# Patient Record
Sex: MALE | Race: BLACK OR AFRICAN AMERICAN | Employment: FULL TIME | URBAN - METROPOLITAN AREA
[De-identification: names, ages, dates, MRNs, and addresses within clinical notes are randomized per-mention and may not be internally consistent; named-entity substitution may affect disease eponyms.]

---

## 2022-04-25 ENCOUNTER — HOSPITAL ENCOUNTER (OUTPATIENT)
Dept: GENERAL RADIOLOGY | Age: 57
Discharge: HOME | End: 2022-04-27
Payer: COMMERCIAL

## 2022-04-25 ENCOUNTER — HOSPITAL ENCOUNTER (OUTPATIENT)
Age: 57
Discharge: HOME | End: 2022-04-25
Payer: COMMERCIAL

## 2022-04-25 ENCOUNTER — HOSPITAL ENCOUNTER (OUTPATIENT)
Age: 57
Discharge: HOME | End: 2022-04-27
Payer: COMMERCIAL

## 2022-04-25 DIAGNOSIS — R52 PAIN: ICD-10-CM

## 2022-04-25 LAB
ALBUMIN SERPL-MCNC: 3.9 G/DL (ref 3.5–5.2)
ALP BLD-CCNC: 80 U/L (ref 40–129)
ALT SERPL-CCNC: 51 U/L (ref 0–40)
ANION GAP SERPL CALCULATED.3IONS-SCNC: 7 MMOL/L (ref 7–16)
AST SERPL-CCNC: 37 U/L (ref 0–39)
BASOPHILS ABSOLUTE: 0.05 E9/L (ref 0–0.2)
BASOPHILS RELATIVE PERCENT: 0.6 % (ref 0–2)
BILIRUB SERPL-MCNC: <0.2 MG/DL (ref 0–1.2)
BUN BLDV-MCNC: 21 MG/DL (ref 6–20)
CALCIUM SERPL-MCNC: 9 MG/DL (ref 8.6–10.2)
CHLORIDE BLD-SCNC: 105 MMOL/L (ref 98–107)
CHOLESTEROL, TOTAL: 171 MG/DL (ref 0–199)
CO2: 29 MMOL/L (ref 22–29)
CREAT SERPL-MCNC: 0.9 MG/DL (ref 0.7–1.2)
EOSINOPHILS ABSOLUTE: 0.33 E9/L (ref 0.05–0.5)
EOSINOPHILS RELATIVE PERCENT: 4.2 % (ref 0–6)
GFR AFRICAN AMERICAN: >60
GFR NON-AFRICAN AMERICAN: >60 ML/MIN/1.73
GLUCOSE BLD-MCNC: 116 MG/DL (ref 74–99)
HCT VFR BLD CALC: 38 % (ref 37–54)
HDLC SERPL-MCNC: 61 MG/DL
HEMOGLOBIN: 12.2 G/DL (ref 12.5–16.5)
IMMATURE GRANULOCYTES #: 0.03 E9/L
IMMATURE GRANULOCYTES %: 0.4 % (ref 0–5)
LDL CHOLESTEROL CALCULATED: 95 MG/DL (ref 0–99)
LYMPHOCYTES ABSOLUTE: 2.42 E9/L (ref 1.5–4)
LYMPHOCYTES RELATIVE PERCENT: 30.6 % (ref 20–42)
MCH RBC QN AUTO: 28 PG (ref 26–35)
MCHC RBC AUTO-ENTMCNC: 32.1 % (ref 32–34.5)
MCV RBC AUTO: 87.2 FL (ref 80–99.9)
MONOCYTES ABSOLUTE: 0.65 E9/L (ref 0.1–0.95)
MONOCYTES RELATIVE PERCENT: 8.2 % (ref 2–12)
NEUTROPHILS ABSOLUTE: 4.43 E9/L (ref 1.8–7.3)
NEUTROPHILS RELATIVE PERCENT: 56 % (ref 43–80)
PDW BLD-RTO: 14.7 FL (ref 11.5–15)
PLATELET # BLD: 305 E9/L (ref 130–450)
PMV BLD AUTO: 9.9 FL (ref 7–12)
POTASSIUM SERPL-SCNC: 3.7 MMOL/L (ref 3.5–5)
PROSTATE SPECIFIC ANTIGEN: 1.26 NG/ML (ref 0–4)
RBC # BLD: 4.36 E12/L (ref 3.8–5.8)
RHEUMATOID FACTOR: <10 IU/ML (ref 0–13)
SEDIMENTATION RATE, ERYTHROCYTE: 16 MM/HR (ref 0–15)
SODIUM BLD-SCNC: 141 MMOL/L (ref 132–146)
TOTAL PROTEIN: 7.2 G/DL (ref 6.4–8.3)
TRIGL SERPL-MCNC: 74 MG/DL (ref 0–149)
TSH SERPL DL<=0.05 MIU/L-ACNC: 0.96 UIU/ML (ref 0.27–4.2)
VITAMIN D 25-HYDROXY: 39 NG/ML (ref 30–100)
VLDLC SERPL CALC-MCNC: 15 MG/DL
WBC # BLD: 7.9 E9/L (ref 4.5–11.5)

## 2022-04-25 PROCEDURE — 86431 RHEUMATOID FACTOR QUANT: CPT

## 2022-04-25 PROCEDURE — G0103 PSA SCREENING: HCPCS

## 2022-04-25 PROCEDURE — 84443 ASSAY THYROID STIM HORMONE: CPT

## 2022-04-25 PROCEDURE — 85025 COMPLETE CBC W/AUTO DIFF WBC: CPT

## 2022-04-25 PROCEDURE — 82306 VITAMIN D 25 HYDROXY: CPT

## 2022-04-25 PROCEDURE — 36415 COLL VENOUS BLD VENIPUNCTURE: CPT

## 2022-04-25 PROCEDURE — 85651 RBC SED RATE NONAUTOMATED: CPT

## 2022-04-25 PROCEDURE — 80061 LIPID PANEL: CPT

## 2022-04-25 PROCEDURE — 73610 X-RAY EXAM OF ANKLE: CPT

## 2022-04-25 PROCEDURE — 80053 COMPREHEN METABOLIC PANEL: CPT

## 2022-04-25 PROCEDURE — 73562 X-RAY EXAM OF KNEE 3: CPT

## 2022-09-26 ENCOUNTER — OFFICE VISIT (OUTPATIENT)
Dept: PODIATRY | Age: 57
End: 2022-09-26
Payer: COMMERCIAL

## 2022-09-26 VITALS — BODY MASS INDEX: 41.31 KG/M2 | HEIGHT: 72 IN | WEIGHT: 305 LBS

## 2022-09-26 DIAGNOSIS — M25.571 SINUS TARSITIS, RIGHT: ICD-10-CM

## 2022-09-26 DIAGNOSIS — Q66.6 PES PLANOVALGUS: ICD-10-CM

## 2022-09-26 DIAGNOSIS — M25.571 RIGHT ANKLE PAIN, UNSPECIFIED CHRONICITY: Primary | ICD-10-CM

## 2022-09-26 DIAGNOSIS — M76.821 POSTERIOR TIBIAL TENDINITIS, RIGHT: ICD-10-CM

## 2022-09-26 PROCEDURE — 1036F TOBACCO NON-USER: CPT | Performed by: PODIATRIST

## 2022-09-26 PROCEDURE — G8427 DOCREV CUR MEDS BY ELIG CLIN: HCPCS | Performed by: PODIATRIST

## 2022-09-26 PROCEDURE — 3017F COLORECTAL CA SCREEN DOC REV: CPT | Performed by: PODIATRIST

## 2022-09-26 PROCEDURE — 99203 OFFICE O/P NEW LOW 30 MIN: CPT | Performed by: PODIATRIST

## 2022-09-26 PROCEDURE — G8417 CALC BMI ABV UP PARAM F/U: HCPCS | Performed by: PODIATRIST

## 2022-09-26 RX ORDER — HYDROCHLOROTHIAZIDE 12.5 MG/1
TABLET ORAL
COMMUNITY
Start: 2022-08-31

## 2022-09-26 RX ORDER — AMLODIPINE BESYLATE 10 MG/1
TABLET ORAL
COMMUNITY
Start: 2022-08-24

## 2022-09-26 RX ORDER — DICLOFENAC SODIUM 75 MG/1
TABLET, DELAYED RELEASE ORAL
COMMUNITY
Start: 2022-09-08

## 2022-09-26 NOTE — PROGRESS NOTES
New patient in office with c/o right ankle pain[place] [facility] [place] a left knee replacement and would like ankle evaluated prior. Denies any injury. Sx x several years.

## 2022-09-26 NOTE — PROGRESS NOTES
[place] Numbers : [id number] Sex: male  Age: 65 y.o. Patient was referred by Angeles Burrell MD    CC:    Right ankle pain    HPI:   This pleasant 46-year-old male patient referred to me today for right ankle pain. Does have a history of left knee pain with likely total knee replacement on the left knee. No open skin lesions or abrasions. Denies history of diabetes. Does state tenderness on the inside and outside the right foot and ankle worse when he is walking. No current custom or over-the-counter inserts. Does work mostly on his feet. Denies any current use of compression stockings. No additional pedal complaints at this time. ROS:  Const: Denies constitutional symptoms  Musculo: Denies symptoms other than stated above  Skin: Denies symptoms other than stated above       Current Outpatient Medications:     amLODIPine (NORVASC) 10 MG tablet, , Disp: , Rfl:     diclofenac (VOLTAREN) 75 MG EC tablet, TAKE 1 TABLET BY MOUTH TWICE DAILY, Disp: , Rfl:     hydroCHLOROthiazide (HYDRODIURIL) 12.5 MG tablet, , Disp: , Rfl:   No Known Allergies    No past medical history on file. Vitals:    22 0943   Weight: (!) 305 lb (138.3 kg)   Height: 6' (1.829 m)       Work History/Social History: Foot and ankle history:     Focused Lower Extremity Physical Exam:    Neurovascular examination:    Dorsalis Pedis palpable bilateral.  Posterior tibialis palpable bilateral.    Capillary Refill Time:  Immediate return  Hair growth:  Symmetrical and bilateral   Skin:  Not atrophic  Edema: No edema bilateral feet or ankles. Neurologic:  Light touch intact bilateral.      Musculoskeletal/ Orthopedic examination:    Equinis: Absent bilateral  Dorsiflexion, plantarflexion, inversion, eversion bilateral 5 out of 5 muscle strength  Wiggling toes  Negative Homans  Pes planovalgus bilateral worse on the right  Mild tenderness insertion posterior tibial tendon right.   Mild tenderness within sinus tarsi with inversion and eversion right foot and ankle. Dermatology examination:    No open skin lesions or abrasions bilateral lower extremity. Assessment and Plan:  Laurent Russell was seen today for ankle pain. Diagnoses and all orders for this visit:    Right ankle pain, unspecified chronicity    Posterior tibial tendinitis, right    Sinus tarsitis, right          MRI right ankle    IMPRESSION:  1. Moderate chronic plantar fasciitis. 2. 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 3. Mild subtalar degenerative change. History adry davila      Laurent Russell is seen today new referral.  Clinically presents posterior tibial tendinitis, sinus tarsitis and pes planovalgus. We did discuss MRI right ankle from [facility]. I did recommend power step orthotics. We did also recommend full-length custom inserts to accommodate posterior tibial tendinitis and sinus tarsitis. Likely needs surgery upcoming left knee. Hopefully we do have the custom inserts soon as well as over-the-counter inserts for his regular shoes. I did discuss cortisone injection sinus tarsi if continued symptoms. I would recommend continued conservative treatment from foot and ankle standpoint at this time and will follow-up in office 6 weeks.

## 2022-09-30 ENCOUNTER — TELEPHONE (OUTPATIENT)
Dept: PODIATRY | Age: 57
End: 2022-09-30

## 2022-09-30 NOTE — TELEPHONE ENCOUNTER
[address] to verify benefits for Orthotics ()  Spoke with Sanya Galeana. Orthotics () covered @ 0 %. Patent does not have any coverage through his plan for any type of insert, orthotic, or arch support. Ref # [id number]    Patient informed. Has a script for orthotics.

## 2023-07-27 ENCOUNTER — HOSPITAL ENCOUNTER (OUTPATIENT)
Age: 58
Discharge: HOME | End: 2023-07-27
Payer: COMMERCIAL

## 2023-07-27 LAB
ALBUMIN SERPL-MCNC: 4.2 G/DL (ref 3.5–5.2)
ALP SERPL-CCNC: 73 U/L (ref 40–129)
ALT SERPL-CCNC: 66 U/L (ref 0–40)
ANION GAP SERPL CALCULATED.3IONS-SCNC: 11 MMOL/L (ref 7–16)
AST SERPL-CCNC: 55 U/L (ref 0–39)
BASOPHILS # BLD: 0.04 K/UL (ref 0–0.2)
BASOPHILS NFR BLD: 1 % (ref 0–2)
BILIRUB SERPL-MCNC: 0.6 MG/DL (ref 0–1.2)
BUN SERPL-MCNC: 22 MG/DL (ref 6–20)
CALCIUM SERPL-MCNC: 8.6 MG/DL (ref 8.6–10.2)
CHLORIDE SERPL-SCNC: 109 MMOL/L (ref 98–107)
CHOLEST SERPL-MCNC: 171 MG/DL
CO2 SERPL-SCNC: 27 MMOL/L (ref 22–29)
CREAT SERPL-MCNC: 1 MG/DL (ref 0.7–1.2)
EOSINOPHIL # BLD: 0.32 K/UL (ref 0.05–0.5)
EOSINOPHILS RELATIVE PERCENT: 5 % (ref 0–6)
ERYTHROCYTE [DISTWIDTH] IN BLOOD BY AUTOMATED COUNT: 14.9 % (ref 11.5–15)
ERYTHROCYTE [SEDIMENTATION RATE] IN BLOOD BY WESTERGREN METHOD: 8 MM/HR (ref 0–15)
GFR SERPL CREATININE-BSD FRML MDRD: >60 ML/MIN/1.73M2
GLUCOSE SERPL-MCNC: 96 MG/DL (ref 74–99)
HCT VFR BLD AUTO: 37.7 % (ref 37–54)
HDLC SERPL-MCNC: 67 MG/DL
HGB BLD-MCNC: 11.9 G/DL (ref 12.5–16.5)
IMM GRANULOCYTES # BLD AUTO: <0.03 K/UL (ref 0–0.58)
IMM GRANULOCYTES NFR BLD: 0 % (ref 0–5)
LDLC SERPL CALC-MCNC: 90 MG/DL
LYMPHOCYTES NFR BLD: 2.36 K/UL (ref 1.5–4)
LYMPHOCYTES RELATIVE PERCENT: 35 % (ref 20–42)
MCH RBC QN AUTO: 27.9 PG (ref 26–35)
MCHC RBC AUTO-ENTMCNC: 31.6 G/DL (ref 32–34.5)
MCV RBC AUTO: 88.3 FL (ref 80–99.9)
MONOCYTES NFR BLD: 0.67 K/UL (ref 0.1–0.95)
MONOCYTES NFR BLD: 10 % (ref 2–12)
NEUTROPHILS NFR BLD: 50 % (ref 43–80)
NEUTS SEG NFR BLD: 3.44 K/UL (ref 1.8–7.3)
PLATELET # BLD AUTO: 279 K/UL (ref 130–450)
PMV BLD AUTO: 10.4 FL (ref 7–12)
POTASSIUM SERPL-SCNC: 3.8 MMOL/L (ref 3.5–5)
PROT SERPL-MCNC: 7 G/DL (ref 6.4–8.3)
PSA SERPL-MCNC: 0.91 NG/ML (ref 0–4)
RBC # BLD AUTO: 4.27 M/UL (ref 3.8–5.8)
SODIUM SERPL-SCNC: 147 MMOL/L (ref 132–146)
TRIGL SERPL-MCNC: 70 MG/DL
VLDLC SERPL CALC-MCNC: 14 MG/DL
WBC OTHER # BLD: 6.8 K/UL (ref 4.5–11.5)

## 2023-07-27 PROCEDURE — 82652 VIT D 1 25-DIHYDROXY: CPT

## 2023-07-27 PROCEDURE — G0103 PSA SCREENING: HCPCS

## 2023-07-27 PROCEDURE — 80061 LIPID PANEL: CPT

## 2023-07-27 PROCEDURE — 80053 COMPREHEN METABOLIC PANEL: CPT

## 2023-07-27 PROCEDURE — 85652 RBC SED RATE AUTOMATED: CPT

## 2023-07-27 PROCEDURE — 36415 COLL VENOUS BLD VENIPUNCTURE: CPT

## 2023-07-27 PROCEDURE — 85027 COMPLETE CBC AUTOMATED: CPT

## 2023-08-02 LAB — 1,25(OH)2D3 SERPL-MCNC: 47.9 PG/ML (ref 19.9–79.3)

## 2023-10-04 ENCOUNTER — HOSPITAL ENCOUNTER (OUTPATIENT)
Age: 58
Discharge: HOME | End: 2023-10-04
Payer: COMMERCIAL

## 2023-10-04 LAB
ANION GAP SERPL CALCULATED.3IONS-SCNC: 11 MMOL/L (ref 7–16)
BASOPHILS # BLD: 0.04 K/UL (ref 0–0.2)
BASOPHILS NFR BLD: 1 % (ref 0–2)
BUN SERPL-MCNC: 19 MG/DL (ref 6–20)
CALCIUM SERPL-MCNC: 8.9 MG/DL (ref 8.6–10.2)
CHLORIDE SERPL-SCNC: 109 MMOL/L (ref 98–107)
CO2 SERPL-SCNC: 26 MMOL/L (ref 22–29)
CREAT SERPL-MCNC: 1 MG/DL (ref 0.7–1.2)
EOSINOPHIL # BLD: 0.26 K/UL (ref 0.05–0.5)
EOSINOPHILS RELATIVE PERCENT: 5 % (ref 0–6)
ERYTHROCYTE [DISTWIDTH] IN BLOOD BY AUTOMATED COUNT: 14.5 % (ref 11.5–15)
GFR SERPL CREATININE-BSD FRML MDRD: >60 ML/MIN/1.73M2
GLUCOSE SERPL-MCNC: 89 MG/DL (ref 74–99)
HBA1C MFR BLD: 6.1 % (ref 4–5.6)
HCT VFR BLD AUTO: 37.3 % (ref 37–54)
HGB BLD-MCNC: 12.1 G/DL (ref 12.5–16.5)
IMM GRANULOCYTES # BLD AUTO: <0.03 K/UL (ref 0–0.58)
IMM GRANULOCYTES NFR BLD: 0 % (ref 0–5)
INR PPP: 1.1
LYMPHOCYTES NFR BLD: 2.02 K/UL (ref 1.5–4)
LYMPHOCYTES RELATIVE PERCENT: 36 % (ref 20–42)
MCH RBC QN AUTO: 28.5 PG (ref 26–35)
MCHC RBC AUTO-ENTMCNC: 32.4 G/DL (ref 32–34.5)
MCV RBC AUTO: 87.8 FL (ref 80–99.9)
MONOCYTES NFR BLD: 0.65 K/UL (ref 0.1–0.95)
MONOCYTES NFR BLD: 12 % (ref 2–12)
NEUTROPHILS NFR BLD: 47 % (ref 43–80)
NEUTS SEG NFR BLD: 2.69 K/UL (ref 1.8–7.3)
PLATELET # BLD AUTO: 273 K/UL (ref 130–450)
PMV BLD AUTO: 10.1 FL (ref 7–12)
POTASSIUM SERPL-SCNC: 3.8 MMOL/L (ref 3.5–5)
PROTHROMBIN TIME: 12.2 SEC (ref 9.3–12.4)
RBC # BLD AUTO: 4.25 M/UL (ref 3.8–5.8)
SODIUM SERPL-SCNC: 146 MMOL/L (ref 132–146)
WBC OTHER # BLD: 5.7 K/UL (ref 4.5–11.5)

## 2023-10-04 PROCEDURE — 85025 COMPLETE CBC W/AUTO DIFF WBC: CPT

## 2023-10-04 PROCEDURE — 80048 BASIC METABOLIC PNL TOTAL CA: CPT

## 2023-10-04 PROCEDURE — 85610 PROTHROMBIN TIME: CPT

## 2023-10-04 PROCEDURE — 36415 COLL VENOUS BLD VENIPUNCTURE: CPT

## 2023-10-04 PROCEDURE — 83036 HEMOGLOBIN GLYCOSYLATED A1C: CPT

## 2023-10-30 ENCOUNTER — HOSPITAL ENCOUNTER (OUTPATIENT)
Age: 58
Discharge: HOME | End: 2023-10-30
Payer: COMMERCIAL

## 2023-10-30 LAB
EKG ATRIAL RATE: 79 BPM
EKG P AXIS: 25 DEGREES
EKG P-R INTERVAL: 136 MS
EKG Q-T INTERVAL: 390 MS
EKG QRS DURATION: 86 MS
EKG QTC CALCULATION (BAZETT): 447 MS
EKG R AXIS: 46 DEGREES
EKG T AXIS: 178 DEGREES
EKG VENTRICULAR RATE: 79 BPM

## 2023-10-30 PROCEDURE — 93005 ELECTROCARDIOGRAM TRACING: CPT | Performed by: FAMILY MEDICINE

## 2023-10-30 PROCEDURE — 93010 ELECTROCARDIOGRAM REPORT: CPT | Performed by: INTERNAL MEDICINE

## 2023-11-07 ENCOUNTER — TELEPHONE (OUTPATIENT)
Dept: ADMINISTRATIVE | Age: 58
End: 2023-11-07

## 2023-11-07 NOTE — TELEPHONE ENCOUNTER
Patient Appointment Form:      PCP: Dr. Iris Daniels  Referring: Dr. Iris Daniels    Has the Patient:    Seen a Cardiologist? no    Had a heart catheterization? no    Had heart surgery? no    Had a stress test or nuclear stress test? no    Had an echocardiogram? no    Had a vascular ultrasound? no    Had a 24/48 heart monitor or extended cardiac event monitor? no    Had recent blood work in the last 6 months? yes    date: 10/04/2023    ordering physician: Michelle Braga    Had a pacemaker/ICD/ILR implant? no    Seen an Electrophysiologist? no        Will send records via: fax  Dr. Iris Daniels is faxing abnormal EKG to Office    Date & time of appointment:  [email or web address]

## 2023-11-08 ENCOUNTER — OFFICE VISIT (OUTPATIENT)
Dept: CARDIOLOGY CLINIC | Age: 58
End: 2023-11-08

## 2023-11-08 VITALS
RESPIRATION RATE: 18 BRPM | HEART RATE: 75 BPM | BODY MASS INDEX: 39.6 KG/M2 | WEIGHT: 298.8 LBS | SYSTOLIC BLOOD PRESSURE: 138 MMHG | DIASTOLIC BLOOD PRESSURE: 74 MMHG | HEIGHT: 73 IN

## 2023-11-08 DIAGNOSIS — I10 PRIMARY HYPERTENSION: ICD-10-CM

## 2023-11-08 DIAGNOSIS — E66.09 CLASS 2 OBESITY DUE TO EXCESS CALORIES WITHOUT SERIOUS COMORBIDITY WITH BODY MASS INDEX (BMI) OF 39.0 TO 39.9 IN ADULT: ICD-10-CM

## 2023-11-08 DIAGNOSIS — Z01.818 PRE-OP EVALUATION: ICD-10-CM

## 2023-11-08 DIAGNOSIS — R94.31 ABNORMAL EKG: Primary | ICD-10-CM

## 2023-11-08 RX ORDER — REGADENOSON 0.08 MG/ML
0.4 INJECTION, SOLUTION INTRAVENOUS
OUTPATIENT
Start: 2023-11-08

## 2023-11-08 RX ORDER — M-VIT,TX,IRON,MINS/CALC/FOLIC 27MG-0.4MG
1 TABLET ORAL DAILY
COMMUNITY

## 2023-11-08 RX ORDER — ASPIRIN 81 MG/1
81 TABLET ORAL DAILY
COMMUNITY

## 2023-11-09 ENCOUNTER — HOSPITAL ENCOUNTER (OUTPATIENT)
Age: 58
Discharge: HOME | End: 2023-11-11

## 2023-11-09 LAB
ABO + RH BLD: NORMAL
ARM BAND NUMBER: NORMAL
BLOOD BANK SAMPLE EXPIRATION: NORMAL
BLOOD GROUP ANTIBODIES SERPL: NEGATIVE

## 2023-11-09 PROCEDURE — 86900 BLOOD TYPING SEROLOGIC ABO: CPT

## 2023-11-09 PROCEDURE — 86901 BLOOD TYPING SEROLOGIC RH(D): CPT

## 2023-11-09 PROCEDURE — 87081 CULTURE SCREEN ONLY: CPT

## 2023-11-09 PROCEDURE — 86850 RBC ANTIBODY SCREEN: CPT

## 2023-11-10 ENCOUNTER — TELEPHONE (OUTPATIENT)
Dept: CARDIOLOGY | Age: 58
End: 2023-11-10

## 2023-11-10 NOTE — TELEPHONE ENCOUNTER
Left message on voice mail to remind patient of pharmacological stress test appointment on November 14, 2023 at 0900. Instructions for test, and COVID-19 preprocedure information left on voice mail. Asked patient to call with any questions or if unable to keep appointment.

## 2023-11-11 LAB
MICROORGANISM SPEC CULT: ABNORMAL
SPECIMEN DESCRIPTION: ABNORMAL

## 2023-11-14 ENCOUNTER — HOSPITAL ENCOUNTER (OUTPATIENT)
Dept: CARDIOLOGY | Age: 58
Discharge: HOME | End: 2023-11-16
Attending: INTERNAL MEDICINE
Payer: COMMERCIAL

## 2023-11-14 VITALS
RESPIRATION RATE: 16 BRPM | SYSTOLIC BLOOD PRESSURE: 140 MMHG | DIASTOLIC BLOOD PRESSURE: 88 MMHG | BODY MASS INDEX: 39.49 KG/M2 | HEIGHT: 73 IN | HEART RATE: 76 BPM | WEIGHT: 298 LBS

## 2023-11-14 DIAGNOSIS — R94.31 ABNORMAL EKG: ICD-10-CM

## 2023-11-14 LAB
ECHO BSA: 2.64 M2
STRESS BASELINE DIAS BP: 88 MMHG
STRESS BASELINE HR: 87 BPM
STRESS BASELINE SYS BP: 140 MMHG
STRESS ESTIMATED WORKLOAD: 1.1 METS
STRESS PEAK DIAS BP: 92 MMHG
STRESS PEAK SYS BP: 162 MMHG
STRESS PERCENT HR ACHIEVED: 75 %
STRESS POST PEAK HR: 122 BPM
STRESS RATE PRESSURE PRODUCT: NORMAL BPM*MMHG
STRESS TARGET HR: 163 BPM

## 2023-11-14 PROCEDURE — A9500 TC99M SESTAMIBI: HCPCS | Performed by: INTERNAL MEDICINE

## 2023-11-14 PROCEDURE — 3430000000 HC RX DIAGNOSTIC RADIOPHARMACEUTICAL: Performed by: INTERNAL MEDICINE

## 2023-11-14 PROCEDURE — 6360000002 HC RX W HCPCS: Performed by: INTERNAL MEDICINE

## 2023-11-14 PROCEDURE — 2580000003 HC RX 258: Performed by: INTERNAL MEDICINE

## 2023-11-14 PROCEDURE — 93017 CV STRESS TEST TRACING ONLY: CPT

## 2023-11-14 RX ORDER — REGADENOSON 0.08 MG/ML
0.4 INJECTION, SOLUTION INTRAVENOUS
Status: COMPLETED | OUTPATIENT
Start: 2023-11-14 | End: 2023-11-14

## 2023-11-14 RX ORDER — TETRAKIS(2-METHOXYISOBUTYLISOCYANIDE)COPPER(I) TETRAFLUOROBORATE 1 MG/ML
31.3 INJECTION, POWDER, LYOPHILIZED, FOR SOLUTION INTRAVENOUS ONCE
Status: COMPLETED | OUTPATIENT
Start: 2023-11-14 | End: 2023-11-14

## 2023-11-14 RX ORDER — SODIUM CHLORIDE 0.9 % (FLUSH) 0.9 %
10 SYRINGE (ML) INJECTION PRN
Status: DISCONTINUED | OUTPATIENT
Start: 2023-11-14 | End: 2023-11-17 | Stop reason: HOSPADM

## 2023-11-14 RX ORDER — TETRAKIS(2-METHOXYISOBUTYLISOCYANIDE)COPPER(I) TETRAFLUOROBORATE 1 MG/ML
10.2 INJECTION, POWDER, LYOPHILIZED, FOR SOLUTION INTRAVENOUS
Status: COMPLETED | OUTPATIENT
Start: 2023-11-14 | End: 2023-11-14

## 2023-11-14 RX ADMIN — Medication 31.3 MILLICURIE: at 11:20

## 2023-11-14 RX ADMIN — SODIUM CHLORIDE, PRESERVATIVE FREE 10 ML: 5 INJECTION INTRAVENOUS at 09:12

## 2023-11-14 RX ADMIN — Medication 10.2 MILLICURIE: at 09:11

## 2023-11-14 RX ADMIN — SODIUM CHLORIDE, PRESERVATIVE FREE 10 ML: 5 INJECTION INTRAVENOUS at 11:19

## 2023-11-14 RX ADMIN — REGADENOSON 0.4 MG: 0.08 INJECTION, SOLUTION INTRAVENOUS at 11:19

## 2023-11-14 RX ADMIN — SODIUM CHLORIDE, PRESERVATIVE FREE 10 ML: 5 INJECTION INTRAVENOUS at 11:21

## 2023-11-14 NOTE — RESULT ENCOUNTER NOTE
Stress test looked good. Showed normal blood flow to the heart with normal pumping function, no evidence of any blockages. Echo still pending but may proceed without further cardiac testing with surgery. RCRI class I risk, 0.4% risk of major cardiac event.

## 2023-11-15 ENCOUNTER — TELEPHONE (OUTPATIENT)
Dept: CARDIOLOGY CLINIC | Age: 58
End: 2023-11-15

## 2023-11-15 NOTE — TELEPHONE ENCOUNTER
----- Message from Geoffrey Mcrae MD sent at 11/14/2023  6:49 PM EST -----  Stress test looked good. Showed normal blood flow to the heart with normal pumping function, no evidence of any blockages. Echo still pending but may proceed without further cardiac testing with surgery. RCRI class I risk, 0.4% risk of major cardiac event.

## 2023-11-16 NOTE — TELEPHONE ENCOUNTER
Patient notified of Stress results and Dr. Mitchell Cable recommendation to proceed with surgery without further cardiac testing.

## 2023-11-21 ENCOUNTER — HOSPITAL ENCOUNTER (OUTPATIENT)
Age: 58
Discharge: HOME | End: 2023-11-23
Payer: COMMERCIAL

## 2023-11-21 LAB
ANION GAP SERPL CALCULATED.3IONS-SCNC: 10 MMOL/L (ref 7–16)
BUN SERPL-MCNC: 18 MG/DL (ref 6–20)
CALCIUM SERPL-MCNC: 8.6 MG/DL (ref 8.6–10.2)
CHLORIDE SERPL-SCNC: 102 MMOL/L (ref 98–107)
CO2 SERPL-SCNC: 26 MMOL/L (ref 22–29)
CREAT SERPL-MCNC: 1 MG/DL (ref 0.7–1.2)
ERYTHROCYTE [DISTWIDTH] IN BLOOD BY AUTOMATED COUNT: 14.5 % (ref 11.5–15)
GFR SERPL CREATININE-BSD FRML MDRD: >60 ML/MIN/1.73M2
GLUCOSE SERPL-MCNC: 134 MG/DL (ref 74–99)
HCT VFR BLD AUTO: 30 % (ref 37–54)
HGB BLD-MCNC: 9.5 G/DL (ref 12.5–16.5)
MCH RBC QN AUTO: 28.5 PG (ref 26–35)
MCHC RBC AUTO-ENTMCNC: 31.7 G/DL (ref 32–34.5)
MCV RBC AUTO: 90.1 FL (ref 80–99.9)
PLATELET # BLD AUTO: 260 K/UL (ref 130–450)
PMV BLD AUTO: 10.4 FL (ref 7–12)
POTASSIUM SERPL-SCNC: 4 MMOL/L (ref 3.5–5)
RBC # BLD AUTO: 3.33 M/UL (ref 3.8–5.8)
SODIUM SERPL-SCNC: 138 MMOL/L (ref 132–146)
WBC OTHER # BLD: 10 K/UL (ref 4.5–11.5)

## 2023-11-21 PROCEDURE — 80048 BASIC METABOLIC PNL TOTAL CA: CPT

## 2023-11-21 PROCEDURE — 85027 COMPLETE CBC AUTOMATED: CPT

## 2025-08-04 ENCOUNTER — HOSPITAL ENCOUNTER (OUTPATIENT)
Dept: GENERAL RADIOLOGY | Age: 60
Discharge: HOME | End: 2025-08-06
Payer: COMMERCIAL

## 2025-08-04 ENCOUNTER — HOSPITAL ENCOUNTER (OUTPATIENT)
Age: 60
Discharge: HOME | End: 2025-08-06
Payer: COMMERCIAL

## 2025-08-04 ENCOUNTER — HOSPITAL ENCOUNTER (OUTPATIENT)
Dept: LAB | Age: 60
Discharge: HOME | End: 2025-08-04
Payer: COMMERCIAL

## 2025-08-04 DIAGNOSIS — M25.571 ARTHRALGIA OF RIGHT ANKLE: ICD-10-CM

## 2025-08-04 LAB
25(OH)D3 SERPL-MCNC: 37.5 NG/ML (ref 30–100)
ALBUMIN SERPL-MCNC: 4 G/DL (ref 3.5–5.2)
ALP SERPL-CCNC: 85 U/L (ref 40–129)
ALT SERPL-CCNC: 86 U/L (ref 0–50)
ANION GAP SERPL CALCULATED.3IONS-SCNC: 13 MMOL/L (ref 7–16)
AST SERPL-CCNC: 60 U/L (ref 0–50)
BASOPHILS # BLD: 0.04 K/UL (ref 0–0.2)
BASOPHILS NFR BLD: 1 % (ref 0–2)
BILIRUB SERPL-MCNC: 0.4 MG/DL (ref 0–1.2)
BUN SERPL-MCNC: 15 MG/DL (ref 6–20)
CALCIUM SERPL-MCNC: 9.2 MG/DL (ref 8.6–10)
CHLORIDE SERPL-SCNC: 104 MMOL/L (ref 98–107)
CHOLEST SERPL-MCNC: 178 MG/DL
CO2 SERPL-SCNC: 27 MMOL/L (ref 22–29)
CREAT SERPL-MCNC: 0.9 MG/DL (ref 0.7–1.2)
EOSINOPHIL # BLD: 0.17 K/UL (ref 0.05–0.5)
EOSINOPHILS RELATIVE PERCENT: 3 % (ref 0–6)
ERYTHROCYTE [DISTWIDTH] IN BLOOD BY AUTOMATED COUNT: 14.9 % (ref 11.5–15)
GFR, ESTIMATED: >90 ML/MIN/1.73M2
GLUCOSE SERPL-MCNC: 105 MG/DL (ref 74–99)
HCT VFR BLD AUTO: 38.2 % (ref 37–54)
HDLC SERPL-MCNC: 76 MG/DL
HGB BLD-MCNC: 12.2 G/DL (ref 12.5–16.5)
IMM GRANULOCYTES # BLD AUTO: <0.03 K/UL (ref 0–0.58)
IMM GRANULOCYTES NFR BLD: 0 % (ref 0–5)
LDLC SERPL CALC-MCNC: 85 MG/DL
LYMPHOCYTES NFR BLD: 2.27 K/UL (ref 1.5–4)
LYMPHOCYTES RELATIVE PERCENT: 35 % (ref 20–42)
MCH RBC QN AUTO: 28.4 PG (ref 26–35)
MCHC RBC AUTO-ENTMCNC: 31.9 G/DL (ref 32–34.5)
MCV RBC AUTO: 88.8 FL (ref 80–99.9)
MONOCYTES NFR BLD: 0.58 K/UL (ref 0.1–0.95)
MONOCYTES NFR BLD: 9 % (ref 2–12)
NEUTROPHILS NFR BLD: 53 % (ref 43–80)
NEUTS SEG NFR BLD: 3.44 K/UL (ref 1.8–7.3)
PLATELET # BLD AUTO: 284 K/UL (ref 130–450)
PMV BLD AUTO: 10.3 FL (ref 7–12)
POTASSIUM SERPL-SCNC: 4 MMOL/L (ref 3.5–5.1)
PROT SERPL-MCNC: 7.1 G/DL (ref 6.4–8.3)
PSA SERPL-MCNC: 1.27 NG/ML (ref 0–4)
RBC # BLD AUTO: 4.3 M/UL (ref 3.8–5.8)
SODIUM SERPL-SCNC: 144 MMOL/L (ref 136–145)
TRIGL SERPL-MCNC: 83 MG/DL
TSH SERPL DL<=0.05 MIU/L-ACNC: 0.47 UIU/ML (ref 0.27–4.2)
VLDLC SERPL CALC-MCNC: 17 MG/DL
WBC OTHER # BLD: 6.5 K/UL (ref 4.5–11.5)

## 2025-08-04 PROCEDURE — 80061 LIPID PANEL: CPT

## 2025-08-04 PROCEDURE — 73610 X-RAY EXAM OF ANKLE: CPT

## 2025-08-04 PROCEDURE — 85025 COMPLETE CBC W/AUTO DIFF WBC: CPT

## 2025-08-04 PROCEDURE — 36415 COLL VENOUS BLD VENIPUNCTURE: CPT

## 2025-08-04 PROCEDURE — 82306 VITAMIN D 25 HYDROXY: CPT

## 2025-08-04 PROCEDURE — 84443 ASSAY THYROID STIM HORMONE: CPT

## 2025-08-04 PROCEDURE — 80053 COMPREHEN METABOLIC PANEL: CPT

## 2025-08-04 PROCEDURE — G0103 PSA SCREENING: HCPCS
